# Patient Record
Sex: MALE
[De-identification: names, ages, dates, MRNs, and addresses within clinical notes are randomized per-mention and may not be internally consistent; named-entity substitution may affect disease eponyms.]

---

## 2019-04-23 PROBLEM — Z00.00 ENCOUNTER FOR PREVENTIVE HEALTH EXAMINATION: Status: ACTIVE | Noted: 2019-04-23

## 2019-04-25 ENCOUNTER — APPOINTMENT (OUTPATIENT)
Dept: UROLOGY | Facility: CLINIC | Age: 39
End: 2019-04-25
Payer: COMMERCIAL

## 2019-04-25 VITALS
SYSTOLIC BLOOD PRESSURE: 173 MMHG | BODY MASS INDEX: 30.22 KG/M2 | OXYGEN SATURATION: 97 % | DIASTOLIC BLOOD PRESSURE: 109 MMHG | HEIGHT: 66 IN | HEART RATE: 114 BPM | TEMPERATURE: 98.4 F | WEIGHT: 188 LBS

## 2019-04-25 DIAGNOSIS — Z86.39 PERSONAL HISTORY OF OTHER ENDOCRINE, NUTRITIONAL AND METABOLIC DISEASE: ICD-10-CM

## 2019-04-25 DIAGNOSIS — Z86.79 PERSONAL HISTORY OF OTHER DISEASES OF THE CIRCULATORY SYSTEM: ICD-10-CM

## 2019-04-25 DIAGNOSIS — F17.200 NICOTINE DEPENDENCE, UNSPECIFIED, UNCOMPLICATED: ICD-10-CM

## 2019-04-25 PROCEDURE — 99204 OFFICE O/P NEW MOD 45 MIN: CPT

## 2019-04-25 RX ORDER — CLOMIPHENE CITRATE 50 MG/1
50 TABLET ORAL
Qty: 30 | Refills: 1 | Status: ACTIVE | COMMUNITY
Start: 2019-04-25 | End: 1900-01-01

## 2019-04-25 NOTE — ASSESSMENT
[FreeTextEntry1] : hypogonadsim\par repeat SA with cryopreservation\par karyotype and Y deletions\par quit smoking\par tighten glycemic control\par start clomid\par consider TESE

## 2019-04-25 NOTE — PHYSICAL EXAM
[General Appearance - Well Developed] : well developed [Normal Appearance] : normal appearance [General Appearance - Well Nourished] : well nourished [Heart Rate And Rhythm] : Heart rate and rhythm were normal [Well Groomed] : well groomed [Abdomen Soft] : soft [] : no respiratory distress [Abdomen Tenderness] : non-tender [Costovertebral Angle Tenderness] : no ~M costovertebral angle tenderness [Abdomen Hernia] : no hernia was discovered [Urethral Meatus] : meatus normal [Penis Abnormality] : normal circumcised penis [Urinary Bladder Findings] : the bladder was normal on palpation [Scrotum] : the scrotum was normal [Epididymis] : the epididymides were normal [Testes Tenderness] : no tenderness of the testes [Testes Mass (___cm)] : there were no testicular masses [Normal Station and Gait] : the gait and station were normal for the patient's age [No Focal Deficits] : no focal deficits [Skin Color & Pigmentation] : normal skin color and pigmentation [Oriented To Time, Place, And Person] : oriented to person, place, and time [No Palpable Adenopathy] : no palpable adenopathy [FreeTextEntry1] : 10cc bilateral testes

## 2019-04-25 NOTE — HISTORY OF PRESENT ILLNESS
[FreeTextEntry1] : 38M  to 38F attempting pregnancy for past few months. wife previoulsy pregnant 12 years ago with him ended in miscarriage. +ED. attempted viagra once with minimal improvement. +ejaculatory. good libido. with self stimulation 10/10 erections. no voiding complaints. no blood in urine\par \par patient is diaphoretic and extremely anxious in office. refusing ER visit\par \par labs:\par SA:3.1 ml/no sperm noted/pH 8.1\par \par baseline labs:\par FSH 14.9\par LH 19.4\par \par \par no additonal labs or complaitns

## 2019-04-29 LAB
ESTRADIOL SERPL-MCNC: 7 PG/ML
FSH SERPL-MCNC: 14.1 IU/L
LH SERPL-ACNC: 15.7 IU/L
Y CHROMOSOME MICRODELETION, DNA ANALYSIS: NORMAL

## 2019-05-07 LAB
TESTOST BND SERPL-MCNC: 3.3 PG/ML
TESTOST SERPL-MCNC: 129.8 NG/DL

## 2019-05-09 ENCOUNTER — TRANSCRIPTION ENCOUNTER (OUTPATIENT)
Age: 39
End: 2019-05-09

## 2019-10-10 ENCOUNTER — APPOINTMENT (OUTPATIENT)
Dept: UROLOGY | Facility: CLINIC | Age: 39
End: 2019-10-10
Payer: COMMERCIAL

## 2019-10-10 VITALS — SYSTOLIC BLOOD PRESSURE: 150 MMHG | HEART RATE: 124 BPM | TEMPERATURE: 98.7 F | DIASTOLIC BLOOD PRESSURE: 87 MMHG

## 2019-10-10 PROCEDURE — 99213 OFFICE O/P EST LOW 20 MIN: CPT

## 2019-10-10 RX ORDER — SILDENAFIL 100 MG/1
100 TABLET, FILM COATED ORAL
Qty: 6 | Refills: 11 | Status: ACTIVE | COMMUNITY
Start: 2019-10-10 | End: 1900-01-01

## 2019-10-10 NOTE — HISTORY OF PRESENT ILLNESS
[FreeTextEntry1] : returns for follow up\par not seen x 6months\par on cl[place]id rx in [place]\par  no repeat labs\par reports improved glycemic control\par prev \par repeat SA - azoospermia. 2.0 ml

## 2019-11-21 ENCOUNTER — APPOINTMENT (OUTPATIENT)
Dept: UROLOGY | Facility: CLINIC | Age: 39
End: 2019-11-21
Payer: COMMERCIAL

## 2019-11-21 VITALS — DIASTOLIC BLOOD PRESSURE: 95 MMHG | TEMPERATURE: 98.2 F | HEART RATE: 129 BPM | SYSTOLIC BLOOD PRESSURE: 174 MMHG

## 2019-11-21 DIAGNOSIS — E29.1 TESTICULAR HYPOFUNCTION: ICD-10-CM

## 2019-11-21 PROCEDURE — 99214 OFFICE O/P EST MOD 30 MIN: CPT

## 2019-11-21 NOTE — HISTORY OF PRESENT ILLNESS
[FreeTextEntry1] : on clomid 50 qod x few months\par did repeat SA 9/20/19\par volume 2ml/azoopsermia/fructose\par A1c 6%\par TT now 880\par FSH 22.7\par LH 19.9\par E2 141\par

## 2019-11-21 NOTE — ASSESSMENT
[FreeTextEntry1] : nonobstructive azoospermia\par improved hormonal producition\par no karyotype to date\par I had a long conversation with him and his wife regarding the role of surgical sperm retrieval. They understand that some patients harbor pockets of spermatogenesis within the testis that can be retrieved surgically and used with IVF. In general, there is an approximately 45-60% likelihood of successful retrieval. Retrieval options, including a complete microdissection of the testis and more limited testicular biopsies were also discussed. They understand that the microTESE may have the best likelihood of identifying spermatogenesis. Risks of surgery, including hematoma, pain, long term hypogonadism requiring testosterone replacement therapy, were also discussed.\par discussed at length\par he will consider\par he will call sperm bank to discuss costs

## 2020-02-26 RX ORDER — CLOMIPHENE CITRATE 50 MG/1
50 TABLET ORAL
Qty: 30 | Refills: 1 | Status: ACTIVE | COMMUNITY
Start: 2019-10-10 | End: 1900-01-01

## 2021-12-26 NOTE — ASSESSMENT
[FreeTextEntry1] : hypogonadism\par repeat T labs\par A1c\par continue clomid\par f/u in office 2-4 wks\par 
aching